# Patient Record
Sex: FEMALE | Race: BLACK OR AFRICAN AMERICAN | Employment: OTHER | URBAN - METROPOLITAN AREA
[De-identification: names, ages, dates, MRNs, and addresses within clinical notes are randomized per-mention and may not be internally consistent; named-entity substitution may affect disease eponyms.]

---

## 2017-06-22 ENCOUNTER — APPOINTMENT (OUTPATIENT)
Dept: GENERAL RADIOLOGY | Facility: CLINIC | Age: 42
End: 2017-06-22
Attending: EMERGENCY MEDICINE
Payer: COMMERCIAL

## 2017-06-22 ENCOUNTER — HOSPITAL ENCOUNTER (EMERGENCY)
Facility: CLINIC | Age: 42
Discharge: HOME | End: 2017-06-22
Attending: EMERGENCY MEDICINE | Admitting: EMERGENCY MEDICINE
Payer: COMMERCIAL

## 2017-06-22 VITALS
OXYGEN SATURATION: 97 % | TEMPERATURE: 95.2 F | DIASTOLIC BLOOD PRESSURE: 72 MMHG | RESPIRATION RATE: 16 BRPM | HEART RATE: 85 BPM | SYSTOLIC BLOOD PRESSURE: 118 MMHG
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
## 2017-06-22 DIAGNOSIS — S80.02XA CONTUSION OF LEFT KNEE, INITIAL ENCOUNTER: ICD-10-CM

## 2017-06-22 LAB
HCG UR QL: NEGATIVE
INTERNAL QC OK POCT: YES

## 2017-06-22 PROCEDURE — 99284 EMERGENCY DEPT VISIT MOD MDM: CPT | Mod: Z6 | Performed by: EMERGENCY MEDICINE

## 2017-06-22 PROCEDURE — 99284 EMERGENCY DEPT VISIT MOD MDM: CPT | Performed by: EMERGENCY MEDICINE

## 2017-06-22 PROCEDURE — 81025 URINE PREGNANCY TEST: CPT | Performed by: EMERGENCY MEDICINE

## 2017-06-22 PROCEDURE — 73562 X-RAY EXAM OF KNEE 3: CPT | Mod: LT

## 2017-06-22 RX ORDER — IBUPROFEN 600 MG/1
600 TABLET, FILM COATED ORAL EVERY 6 HOURS PRN
Qty: 30 TABLET | Refills: 0 | Status: SHIPPED | OUTPATIENT
Start: 2017-06-22

## 2017-06-22 RX ORDER — IBUPROFEN 600 MG/1
600 TABLET, FILM COATED ORAL ONCE
Status: DISCONTINUED | OUTPATIENT
Start: 2017-06-22 | End: 2017-06-23 | Stop reason: HOSPADM

## 2017-06-22 ASSESSMENT — ENCOUNTER SYMPTOMS
NECK PAIN: 0
NUMBNESS: 0
ABDOMINAL PAIN: 0
WEAKNESS: 0
SHORTNESS OF BREATH: 0

## 2017-06-22 NOTE — ED AVS SNAPSHOT
[facility], [place], Emergency Department    [address]    [place] [address]    Phone:  [phone]    Fax:  [phone]                                       Ashlyn Morton   MRN: [id number]    Department:  [facility], Emergency Department   Date of Visit:  6/22/2017           After Visit Summary Signature Page     I have received my discharge instructions, and my questions have been answered. I have discussed any challenges I see with this plan with the nurse or doctor.    ..........................................................................................................................................  Patient/Patient Representative Signature      ..........................................................................................................................................  Patient Representative Print Name and Relationship to Patient    ..................................................               ................................................  Date                                            Time    ..........................................................................................................................................  Reviewed by Signature/Title    ...................................................              ..............................................  Date                                                            Time

## 2017-06-22 NOTE — ED AVS SNAPSHOT
[facility], Emergency Department    [address]    [place] [address]    Phone:  [phone]    Fax:  [phone]                                       Ashlyn Morton   MRN: [id number]    Department:  [facility], Emergency Department   Date of Visit:  6/22/2017           Patient Information     Date Of Birth          1975        Your diagnoses for this visit were:     Contusion of left knee, initial encounter        You were seen by Vish Bruce MD.        Discharge Instructions         Lower Extremity Contusion  You have a contusion (bruise) of a lower extremity (leg, knee, ankle, foot, or toe). Symptoms include pain, swelling, and skin discoloration. No bones are broken. This injury may take from a few days to a few weeks to heal.  During that time, the bruise may change from reddish in color, to purple-blue, to green-yellow, to yellow-brown.  Home care    Unless another medicine was prescribed, you can take acetaminophen, ibuprofen, or naproxen to control pain. (If you have chronic liver or kidney disease or ever had a stomach ulcer or gastrointestinal bleeding, talk with your doctor before using these medicines.)    Elevate the injured area to reduce pain and swelling. As much as possible, sit or lie down with the injured area raised about the level of your heart. This is especially important during the first 48 hours.    Ice the injured area to help reduce pain and swelling. Wrap a cold source (ice pack or ice cubes in a plastic bag) in a thin towel. Apply to the bruised area for 20 minutes every 1 to 2 hours the first day. Continue this 3 to 4 times a day until the pain and swelling goes away.    If crutches have been advised, do not bear full weight on the injured leg until you can do so without pain. You may return to sports when you are able to put full weight and impact on the injured leg without pain.  Follow up  Follow up with your healthcare provider or our staff as advised. Call if  you are not improving within the next 1 to 2 weeks.  When to seek medical advice   Call your healthcare provider right away if any of these occur:    Increased pain or swelling    Foot or toes become cold, blue, numb or tingly    Signs of infection: Warmth, drainage, or increased redness or pain around the injury    Inability to move the injured area     Frequent bruising for unknown reasons  Date Last Reviewed: 2/1/2017 2000-2017 [employer]. [address] [place] [address], [place] [address]. All rights reserved. This information is not intended as a substitute for professional medical care. Always follow your healthcare professional's instructions.      Take ibuprofen as needed for pain.  Ice as discussed.    Follow-up with your employer's occupational health provider or your primary care provider in one week as needed.    Discharge References/Attachments     LOWER EXTREMITY CONTUSION (ENGLISH)      24 Hour Appointment Hotline       To make an appointment at any [facility], call [phone] ([phone]). If you don't have a family doctor or clinic, we will help you find one. [facility] clinics are conveniently located to serve the needs of you and your family.             Review of your medicines      START taking        Dose / Directions Last dose taken    ibuprofen 600 MG tablet   Commonly known as:  ADVIL/MOTRIN   Dose:  600 mg   Quantity:  30 tablet        Take 1 tablet (600 mg) by mouth every 6 hours as needed for moderate pain   Refills:  0                Prescriptions were sent or printed at these locations (1 Prescription)                   Other Prescriptions                Printed at Department/Unit printer (1 of 1)         ibuprofen (ADVIL/MOTRIN) 600 MG tablet                Procedures and tests performed during your visit     Knee XR, 3 views, left    hCG qual urine POCT      Orders Needing Specimen Collection     None      Pending Results     No orders found from 6/20/2017 to  "2017.            Pending Culture Results     No orders found from 2017 to 2017.            Pending Results Instructions     If you had any lab results that were not finalized at the time of your Discharge, you can call the ED Lab Result RN at [phone]. You will be contacted by this team for any positive Lab results or changes in treatment. The nurses are available 7 days a week from 10A to 6:30P.  You can leave a message 24 hours per day and they will return your call.        Thank you for choosing [facility]       Thank you for choosing [facility] for your care. Our goal is always to provide you with excellent care. Hearing back from our patients is one way we can continue to improve our services. Please take a few minutes to complete the written survey that you may receive in the mail after you visit with us. Thank you!        [email or web address] Information     [facility] lets you send messages to your doctor, view your test results, renew your prescriptions, schedule appointments and more. To sign up, go to [email or web address] . Click on \"Log in\" on the left side of the screen, which will take you to the Welcome page. Then click on \"Sign up Now\" on the right side of the page.     You will be asked to enter the access code listed below, as well as some personal information. Please follow the directions to create your username and password.     Your access code is: [email or web address]  Expires: 2017 10:34 PM     Your access code will  in 90 days. If you need help or a new code, please call your [facility] clinic or [phone].        Care EveryWhere ID     This is your Care EveryWhere ID. This could be used by other organizations to access your [facility] medical records  [id number]        Equal Access to Services     MEAGHAN ARMENTA : Mya Miller, joshua doherty, joe lott. So [facility] [phone].    ATENCIÓN: Si janettela español tiene " a hagan disposición servicios gratuitos de asistencia lingüística. Llfransisco al [phone].    We comply with applicable federal civil rights laws and [place] laws. We do not discriminate on the basis of race, color, national origin, age, disability sex, sexual orientation or gender identity.            After Visit Summary       This is your record. Keep this with you and show to your community pharmacist(s) and doctor(s) at your next visit.

## 2017-06-23 NOTE — ED PROVIDER NOTES
History     Chief Complaint   Patient presents with     Knee Pain     slipped on water on floor at work and fell onto left knee; denies swelling; much pain, and slow to ambulate; denies hitting head     HPI  Ashlyn Morton is a 42 year old female who presents to the emergency department with left knee pain.  Patient states that she slipped on water on the floor while at work today and fell onto her left knee.  She states that she had some mild pain throughout the day but the pain worsened when she returned home this evening.  Her fall occurred approximately 12 hours prior to presentation to the emergency department.  She denies any hip pain.  No ankle pain.  No foot pain.  Patient denies any weakness or numbness.  Patient denies any head injury or loss of consciousness.  She has any chest pain or abdominal pain.  No dyspnea.  She denies any other extremity injury including her bilateral wrists and right lower extremity.  Patient denies anticoagulant use.    I have reviewed the Medications, Allergies, Past Medical and Surgical History, and Social History in the Epic system.    Review of Systems   Respiratory: Negative for shortness of breath.    Cardiovascular: Negative for chest pain.   Gastrointestinal: Negative for abdominal pain.   Musculoskeletal: Negative for neck pain.        Left knee pain   Neurological: Negative for weakness and numbness.   All other systems reviewed and are negative.      Physical Exam   BP: 118/72  Pulse: 85  Temp: 95.2  F (35.1  C)  Resp: 16  SpO2: 97 %  Physical Exam   Constitutional: She appears well-developed and well-nourished. No distress.   HENT:   Head: Normocephalic and atraumatic.   Mouth/Throat: No oropharyngeal exudate.   Eyes: Pupils are equal, round, and reactive to light.   Cardiovascular:   Pulses:       Dorsalis pedis pulses are 2+ on the left side.        Posterior tibial pulses are 2+ on the left side.   Pulmonary/Chest: No respiratory distress.   Musculoskeletal: She  exhibits no edema.        Left hip: Normal.        Left knee: She exhibits decreased range of motion. Tenderness found. Medial joint line tenderness noted.        Left ankle: Normal.        Left foot: Normal.   Neurological: She has normal strength. No sensory deficit.   Skin: Skin is warm and dry. No rash noted. She is not diaphoretic.   Nursing note and vitals reviewed.      ED Course     ED Course     Procedures        Results for orders placed or performed during the hospital encounter of 06/22/17 (from the past 24 hour(s))   hCG qual urine POCT   Result Value Ref Range    HCG Qual Urine Negative neg    Internal QC OK Yes    Knee XR, 3 views, left    Narrative    XR KNEE LT 3 VW   6/22/2017 10:05 PM     HISTORY: Pain after trauma.    COMPARISON: None.    FINDINGS: Negative left knee.      Impression    IMPRESSION: Negative.    LEYDI REBOLLEDO MD        Critical Care time:      Medications   ibuprofen (ADVIL/MOTRIN) tablet 600 mg (600 mg Oral Not Given 6/22/17 2149)             Labs Ordered and Resulted from Time of ED Arrival Up to the Time of Departure from the ED   HCG QUAL URINE POCT - Normal            Assessments & Plan (with Medical Decision Making)   42 year old female emergency department with left knee pain after slip and fall at work.  She does not have evidence for fracture on radiograph.  She declined immobilizer and crutches.  Ibuprofen prescribed for pain.  Occupational health/primary care follow-up in one week if still symptomatic.  Ice also recommended.  Detailed written instructions for lower extremity contusion provided.    I have reviewed the nursing notes.    I have reviewed the findings, diagnosis, plan and need for follow up with the patient.    Discharge Medication List as of 6/22/2017 10:35 PM      START taking these medications    Details   ibuprofen (ADVIL/MOTRIN) 600 MG tablet Take 1 tablet (600 mg) by mouth every 6 hours as needed for moderate pain, Disp-30 tablet, R-0, Local Print              Final diagnoses:   Contusion of left knee, initial encounter       6/22/2017   [facility], [place], EMERGENCY DEPARTMENT     Vish Bruce MD  06/23/17 0029

## 2017-06-23 NOTE — DISCHARGE INSTRUCTIONS
Lower Extremity Contusion  You have a contusion (bruise) of a lower extremity (leg, knee, ankle, foot, or toe). Symptoms include pain, swelling, and skin discoloration. No bones are broken. This injury may take from a few days to a few weeks to heal.  During that time, the bruise may change from reddish in color, to purple-blue, to green-yellow, to yellow-brown.  Home care    Unless another medicine was prescribed, you can take acetaminophen, ibuprofen, or naproxen to control pain. (If you have chronic liver or kidney disease or ever had a stomach ulcer or gastrointestinal bleeding, talk with your doctor before using these medicines.)    Elevate the injured area to reduce pain and swelling. As much as possible, sit or lie down with the injured area raised about the level of your heart. This is especially important during the first 48 hours.    Ice the injured area to help reduce pain and swelling. Wrap a cold source (ice pack or ice cubes in a plastic bag) in a thin towel. Apply to the bruised area for 20 minutes every 1 to 2 hours the first day. Continue this 3 to 4 times a day until the pain and swelling goes away.    If crutches have been advised, do not bear full weight on the injured leg until you can do so without pain. You may return to sports when you are able to put full weight and impact on the injured leg without pain.  Follow up  Follow up with your healthcare provider or our staff as advised. Call if you are not improving within the next 1 to 2 weeks.  When to seek medical advice   Call your healthcare provider right away if any of these occur:    Increased pain or swelling    Foot or toes become cold, blue, numb or tingly    Signs of infection: Warmth, drainage, or increased redness or pain around the injury    Inability to move the injured area     Frequent bruising for unknown reasons  Date Last Reviewed: 2/1/2017 2000-2017 [employer]. [address] [place] [address], [place] [address].  All rights reserved. This information is not intended as a substitute for professional medical care. Always follow your healthcare professional's instructions.      Take ibuprofen as needed for pain.  Ice as discussed.    Follow-up with your employer's occupational health provider or your primary care provider in one week as needed.

## 2020-06-05 ENCOUNTER — HOSPITAL ENCOUNTER (OUTPATIENT)
Dept: ULTRASOUND IMAGING | Facility: CLINIC | Age: 45
Discharge: HOME | End: 2020-06-05
Attending: ADVANCED PRACTICE MIDWIFE | Admitting: ADVANCED PRACTICE MIDWIFE
Payer: COMMERCIAL

## 2020-06-05 DIAGNOSIS — O09.91 HIGH-RISK PREGNANCY IN FIRST TRIMESTER: ICD-10-CM

## 2020-06-05 PROCEDURE — 76801 OB US < 14 WKS SINGLE FETUS: CPT

## 2022-03-21 ENCOUNTER — APPOINTMENT (OUTPATIENT)
Dept: GENERAL RADIOLOGY | Facility: CLINIC | Age: 47
End: 2022-03-21
Attending: EMERGENCY MEDICINE
Payer: COMMERCIAL

## 2022-03-21 LAB
ALBUMIN UR-MCNC: NEGATIVE MG/DL
APPEARANCE UR: CLEAR
BILIRUB UR QL STRIP: NEGATIVE
COLOR UR AUTO: ABNORMAL
GLUCOSE UR STRIP-MCNC: NEGATIVE MG/DL
HCG UR QL: NEGATIVE
HGB UR QL STRIP: NEGATIVE
INTERNAL QC OK POCT: NORMAL
KETONES UR STRIP-MCNC: NEGATIVE MG/DL
LEUKOCYTE ESTERASE UR QL STRIP: NEGATIVE
NITRATE UR QL: NEGATIVE
PH UR STRIP: 7.5 [PH] (ref 5–7)
POCT KIT EXPIRATION DATE: NORMAL
POCT KIT LOT NUMBER: NORMAL
RBC URINE: 3 /HPF
SP GR UR STRIP: 1.02 (ref 1–1.03)
SQUAMOUS EPITHELIAL: 5 /HPF
UROBILINOGEN UR STRIP-MCNC: NORMAL MG/DL
WBC URINE: <1 /HPF

## 2022-03-21 PROCEDURE — 93005 ELECTROCARDIOGRAM TRACING: CPT | Mod: 59 | Performed by: EMERGENCY MEDICINE

## 2022-03-21 PROCEDURE — 81025 URINE PREGNANCY TEST: CPT | Performed by: EMERGENCY MEDICINE

## 2022-03-21 PROCEDURE — 71046 X-RAY EXAM CHEST 2 VIEWS: CPT

## 2022-03-21 PROCEDURE — 93308 TTE F-UP OR LMTD: CPT | Performed by: EMERGENCY MEDICINE

## 2022-03-21 PROCEDURE — 81001 URINALYSIS AUTO W/SCOPE: CPT | Performed by: EMERGENCY MEDICINE

## 2022-03-21 PROCEDURE — 93010 ELECTROCARDIOGRAM REPORT: CPT | Mod: 59 | Performed by: EMERGENCY MEDICINE

## 2022-03-21 PROCEDURE — 99285 EMERGENCY DEPT VISIT HI MDM: CPT | Mod: 25 | Performed by: EMERGENCY MEDICINE

## 2022-03-21 PROCEDURE — 99284 EMERGENCY DEPT VISIT MOD MDM: CPT | Mod: 25 | Performed by: EMERGENCY MEDICINE

## 2022-03-21 PROCEDURE — 93308 TTE F-UP OR LMTD: CPT | Mod: 26 | Performed by: EMERGENCY MEDICINE

## 2022-03-22 ENCOUNTER — ANCILLARY PROCEDURE (OUTPATIENT)
Dept: ULTRASOUND IMAGING | Facility: CLINIC | Age: 47
End: 2022-03-22
Attending: EMERGENCY MEDICINE
Payer: COMMERCIAL

## 2022-03-22 ENCOUNTER — HOSPITAL ENCOUNTER (EMERGENCY)
Facility: CLINIC | Age: 47
Discharge: HOME | End: 2022-03-22
Attending: EMERGENCY MEDICINE | Admitting: EMERGENCY MEDICINE
Payer: COMMERCIAL

## 2022-03-22 VITALS
RESPIRATION RATE: 16 BRPM | DIASTOLIC BLOOD PRESSURE: 57 MMHG | WEIGHT: 221 LBS | OXYGEN SATURATION: 98 % | TEMPERATURE: 97.9 F | HEART RATE: 78 BPM | SYSTOLIC BLOOD PRESSURE: 101 MMHG

## 2022-03-22 DIAGNOSIS — R07.89 CHEST DISCOMFORT: ICD-10-CM

## 2022-03-22 LAB
ANION GAP SERPL CALCULATED.3IONS-SCNC: 8 MMOL/L (ref 3–14)
BASOPHILS # BLD AUTO: 0 10E3/UL (ref 0–0.2)
BASOPHILS NFR BLD AUTO: 0 %
BUN SERPL-MCNC: 14 MG/DL (ref 7–30)
CALCIUM SERPL-MCNC: 9.1 MG/DL (ref 8.5–10.1)
CHLORIDE BLD-SCNC: 106 MMOL/L (ref 94–109)
CO2 SERPL-SCNC: 24 MMOL/L (ref 20–32)
CREAT SERPL-MCNC: 0.52 MG/DL (ref 0.52–1.04)
D DIMER PPP FEU-MCNC: <0.27 UG/ML FEU (ref 0–0.5)
EOSINOPHIL # BLD AUTO: 0.3 10E3/UL (ref 0–0.7)
EOSINOPHIL NFR BLD AUTO: 4 %
ERYTHROCYTE [DISTWIDTH] IN BLOOD BY AUTOMATED COUNT: 12.9 % (ref 10–15)
GFR SERPL CREATININE-BSD FRML MDRD: >90 ML/MIN/1.73M2
GLUCOSE BLD-MCNC: 107 MG/DL (ref 70–99)
HCT VFR BLD AUTO: 40.1 % (ref 35–47)
HGB BLD-MCNC: 13.2 G/DL (ref 11.7–15.7)
HOLD SPECIMEN: NORMAL
IMM GRANULOCYTES # BLD: 0 10E3/UL
IMM GRANULOCYTES NFR BLD: 0 %
LYMPHOCYTES # BLD AUTO: 2.9 10E3/UL (ref 0.8–5.3)
LYMPHOCYTES NFR BLD AUTO: 40 %
MCH RBC QN AUTO: 29.5 PG (ref 26.5–33)
MCHC RBC AUTO-ENTMCNC: 32.9 G/DL (ref 31.5–36.5)
MCV RBC AUTO: 90 FL (ref 78–100)
MONOCYTES # BLD AUTO: 0.7 10E3/UL (ref 0–1.3)
MONOCYTES NFR BLD AUTO: 9 %
NEUTROPHILS # BLD AUTO: 3.3 10E3/UL (ref 1.6–8.3)
NEUTROPHILS NFR BLD AUTO: 47 %
NRBC # BLD AUTO: 0 10E3/UL
NRBC BLD AUTO-RTO: 0 /100
PLAT MORPH BLD: NORMAL
PLATELET # BLD AUTO: 324 10E3/UL (ref 150–450)
POTASSIUM BLD-SCNC: 4.3 MMOL/L (ref 3.4–5.3)
RBC # BLD AUTO: 4.47 10E6/UL (ref 3.8–5.2)
RBC MORPH BLD: NORMAL
SODIUM SERPL-SCNC: 138 MMOL/L (ref 133–144)
TROPONIN I SERPL HS-MCNC: <3 NG/L
WBC # BLD AUTO: 7.2 10E3/UL (ref 4–11)

## 2022-03-22 PROCEDURE — 80048 BASIC METABOLIC PNL TOTAL CA: CPT | Performed by: EMERGENCY MEDICINE

## 2022-03-22 PROCEDURE — 250N000009 HC RX 250: Performed by: EMERGENCY MEDICINE

## 2022-03-22 PROCEDURE — 85025 COMPLETE CBC W/AUTO DIFF WBC: CPT | Performed by: EMERGENCY MEDICINE

## 2022-03-22 PROCEDURE — 250N000013 HC RX MED GY IP 250 OP 250 PS 637: Performed by: EMERGENCY MEDICINE

## 2022-03-22 PROCEDURE — 84484 ASSAY OF TROPONIN QUANT: CPT | Performed by: EMERGENCY MEDICINE

## 2022-03-22 PROCEDURE — 85379 FIBRIN DEGRADATION QUANT: CPT | Performed by: EMERGENCY MEDICINE

## 2022-03-22 PROCEDURE — 36415 COLL VENOUS BLD VENIPUNCTURE: CPT | Performed by: EMERGENCY MEDICINE

## 2022-03-22 RX ORDER — FAMOTIDINE 20 MG/1
20 TABLET, FILM COATED ORAL 2 TIMES DAILY
Qty: 40 TABLET | Refills: 0 | Status: SHIPPED | OUTPATIENT
Start: 2022-03-22

## 2022-03-22 RX ORDER — CALCIUM CARBONATE 500 MG/1
1 TABLET, CHEWABLE ORAL 3 TIMES DAILY PRN
Qty: 30 TABLET | Refills: 0 | Status: SHIPPED | OUTPATIENT
Start: 2022-03-22

## 2022-03-22 RX ADMIN — LIDOCAINE HYDROCHLORIDE 30 ML: 20 SOLUTION ORAL; TOPICAL at 02:21

## 2022-03-22 NOTE — ED NOTES
She is here this morning with feeling light headed, SOB and chest pressure. This started yesterday at work. Pain does not change when she takes a deep breath. She does not appear to be in any distress. She was able to walk to the room w/o being SOB.

## 2022-03-22 NOTE — ED PROVIDER NOTES
"  History     Chief Complaint   Patient presents with     Shortness of Breath     \" short of breath and feeling my heart is heavy since last night\" Has also been dizzy for a week, LMP: Feb 3, missed March     HPI  Ashlyn Morton is a 46 year old female with PMH notable for COVID in Jan 2021 (2 doses Pfizer vaccine) who presents to the ED with shortness of breath and chest discomfort.  Patient ports symptom onset about 24 hours ago.  She points to the anterior superior middle of her chest as location of discomfort.  It is more uncomfortable when she is lying down on her back.  She feels mildly short of breath, like she cannot take a deep breath.  Pain is worse with a deep breath.  No history of DVT/PE, no recent leg swelling, no recent immobilization, no history of malignancy.  No recent fever nor cough.  Pain comes and goes..     Past Medical History  No past medical history on file.  No past surgical history on file.  calcium carbonate (TUMS) 500 MG chewable tablet  famotidine (PEPCID) 20 MG tablet  ibuprofen (ADVIL/MOTRIN) 600 MG tablet      No Known Allergies  Social History   Social History     Tobacco Use     Smoking status: Not on file     Smokeless tobacco: Not on file   Substance Use Topics     Alcohol use: Not on file     Drug use: Not on file      Past medical history and social history were reviewed with the patient. Additional pertinent items: None     Review of Systems  A complete review of systems was performed with pertinent positives and negatives noted in the HPI, and all other systems negative.    Physical Exam   BP: 116/76  Pulse: 78  Temp: 97.9  F (36.6  C)  Resp: 16  Weight: 100.2 kg (221 lb)  SpO2: 99 %    Physical Exam  General: No acute distress. Appears stated age.   HENT: MMM, no oropharyngeal lesions  Eyes: PERRL, normal sclerae  Cardio: Regular rate. Regular rhythm. Extremities well perfused  Resp: Normal work of breathing, normal respiratory rate.  Chest/Back: no visual signs of trauma, no " chest wall tenderness  Abdomen: no tenderness, non-distended, no rebound, no guarding  Neuro: alert and fully oriented. CN II-XII grossly intact. Grossly normal strength and sensation in all extremities.   MSK: no deformities. Grossly normal ROM in extremities.   Integumentary/Skin: no rash visualized, normal color  Psych: normal affect, normal behavior    ED Course      Procedures  Results for orders placed during the hospital encounter of 03/22/22    POC US ECHO LIMITED    Impression  Limited Bedside ED Cardiac Ultrasound  PROCEDURE: PERFORMED BY: Dr. Meek Raines MD  INDICATIONS/SYMPTOM:  Chest Pain  PROBE: Cardiac phased array probe  BODY LOCATION: Chest (cardiac)  FINDINGS: The ultrasound was performed utilizing the subcostal, parasternal long axis, parasternal short axis and apical 4 chamber views.  Grossly normal LV contractility. No RV dilation visualized. No pericardial effusion visualized. IVC grossly small in diameter with > 50% respiratory variability.  INTERPRETATION:    Grossly normal LV contractility. No pericardial effusion. No RV dilation. IVC size and variability consistent with [id number].  IMAGE DOCUMENTATION: Images were archived to PACs system.            EKG Interpretation:      Interpreted by Meek Raines MD  Time reviewed: 0150  Symptoms at time of EKG: chest discomfort, shortness of breath   Rhythm: normal sinus   Rate: Normal  Axis: Normal  Ectopy: none  Conduction: normal  ST Segments/ T Waves: No ST-T wave changes and No acute ischemic changes  Q Waves: very small Q in III  Comparison to prior: No old EKG available    Clinical Impression: normal EKG                   Labs Ordered and Resulted from Time of ED Arrival to Time of ED Departure   ROUTINE UA WITH MICROSCOPIC REFLEX TO CULTURE - Abnormal       Result Value    Color Urine Light Yellow      Appearance Urine Clear      Glucose Urine Negative      Bilirubin Urine Negative      Ketones Urine Negative       Specific Gravity Urine 1.024      Blood Urine Negative      pH Urine 7.5 (*)     Protein Albumin Urine Negative      Urobilinogen Urine Normal      Nitrite Urine Negative      Leukocyte Esterase Urine Negative      RBC Urine 3 (*)     WBC Urine <1      Squamous Epithelials Urine 5 (*)    BASIC METABOLIC PANEL - Abnormal    Sodium 138      Potassium 4.3      Chloride 106      Carbon Dioxide (CO2) 24      Anion Gap 8      Urea Nitrogen 14      Creatinine 0.52      Calcium 9.1      Glucose 107 (*)     GFR Estimate >90     TROPONIN I - Normal    Troponin I High Sensitivity <3     D DIMER QUANTITATIVE - Normal    D-Dimer Quantitative <0.27     HCG QUALITATIVE URINE POCT - Normal    HCG Qual Urine Negative      Internal QC Check POCT Valid      POCT Kit Lot Number [id number]      POCT Kit Expiration Date 2/28/2023     CBC WITH PLATELETS AND DIFFERENTIAL    WBC Count 7.2      RBC Count 4.47      Hemoglobin 13.2      Hematocrit 40.1      MCV 90      MCH 29.5      MCHC 32.9      RDW 12.9      Platelet Count 324      % Neutrophils 47      % Lymphocytes 40      % Monocytes 9      % Eosinophils 4      % Basophils 0      % Immature Granulocytes 0      NRBCs per 100 WBC 0      Absolute Neutrophils 3.3      Absolute Lymphocytes 2.9      Absolute Monocytes 0.7      Absolute Eosinophils 0.3      Absolute Basophils 0.0      Absolute Immature Granulocytes 0.0      Absolute NRBCs 0.0     RBC AND PLATELET MORPHOLOGY    Platelet Assessment        Value: Automated Count Confirmed. Platelet morphology is normal.    RBC Morphology Confirmed RBC Indices       POC US ECHO LIMITED   Final Result   Limited Bedside ED Cardiac Ultrasound   PROCEDURE: PERFORMED BY: Dr. Meek Raines MD   INDICATIONS/SYMPTOM:  Chest Pain   PROBE: Cardiac phased array probe   BODY LOCATION: Chest (cardiac)   FINDINGS: The ultrasound was performed utilizing the subcostal, parasternal long axis, parasternal short axis and apical 4 chamber views.   Grossly normal LV  contractility. No RV dilation visualized. No pericardial effusion visualized. IVC grossly small in diameter with > 50% respiratory variability.    INTERPRETATION:    Grossly normal LV contractility. No pericardial effusion. No RV dilation. IVC size and variability consistent with [id number].    IMAGE DOCUMENTATION: Images were archived to PACs system.         Chest XR,  PA & LAT   Final Result   IMPRESSION: Heart size is normal. Lungs are clear. No pneumothorax or pleural effusion.             Assessments & Plan (with Medical Decision Making)   Patient presenting with chest discomfort and mild SOB. Vitals in the ED unremarkable. Nursing notes reviewed.     ECG shows NSR without evidence of acute ischemia, high-sensitivity troponin undetectably low - ACS very unlikely. VTE risk factor profile low, appropriate for D-dimer risk determination - D-dimer wnl, PE very unlikely. Character and severity of pain less severe than would be expected for aortic dissection. Bedside cardiac US demonstrates grossly normal LV contractility, no RV dilation, no pericardial effusion. Lack of ECG findings and lack of effusion makes pericarditis very unlikely. CXR without evidence of pneumonia, pneumothorax, pleural effusion, nor other visualized pathology.      In the ED, the patient's symptoms were managed with GI cocktail, with resolution of symptoms upon reassessment. Otherwise negative broad work-up with resolution after GI cocktail is suggestive of gastric reflux as likely etiology.     After counseling on the diagnosis, work-up, and treatment plan, the patient was discharged to home. The patient was advised to follow-up with primary care in about a week. The patient was advised to return to the ED if worsening symptoms, or if there are any urgent/life-threatening concerns.     Final diagnoses:   Chest discomfort     New Prescriptions    CALCIUM CARBONATE (TUMS) 500 MG CHEWABLE TABLET    Take 1 tablet (500 mg) by mouth 3  times daily as needed for heartburn    FAMOTIDINE (PEPCID) 20 MG TABLET    Take 1 tablet (20 mg) by mouth 2 times daily       --  Meek Raines MD   Emergency Medicine   [facility] EMERGENCY DEPARTMENT  3/21/2022     Meek Raines MD  03/22/22 3370

## 2022-03-22 NOTE — ED TRIAGE NOTES
"Has been dizzy for a week, could not sleep last night \" because I feel like there is no oxygen\"  Hx COVID Jan 2021  Vaccinated x 2 Pfizer  "

## 2022-03-22 NOTE — DISCHARGE INSTRUCTIONS
Instructions from your doctor today:  Emergency Department testing is focused on the potential causes of your symptoms that are the most dangerous possibilities, and cannot cover every possibility. Based on the evaluation, it was deemed sufficiently safe to discharge and continue management through the clinics. Thus, follow-up is very important to assess for improvement/worsening, potential further testing, and potential treatment adjustments. If you were given opioid pain medications or other medications that can make you drowsy while in the ED, you should not drive for at least several hours and not until you feel completely back to normal.     Please make an appointment to follow up with:  - Your Primary Care Provider in 5-8 days  - If you do not have a primary care provider, you can be seen in follow-up and establish care by calling any of the clinics below:     - [facility] (phone: [phone])     - Primary Care / [facility] (phone: [phone])   - Have your clinic provider review the results from today's visit with you again, including any potential follow-up or additional testing that may be needed based on the results. Occasionally, incidental findings are found on later review by radiologists that may need follow-up.     Return to the Emergency Department immediately if you have worsening symptoms, or any other urgent or potentially life-threatening concerns.

## 2022-03-23 LAB
ATRIAL RATE - MUSE: 73 BPM
DIASTOLIC BLOOD PRESSURE - MUSE: NORMAL MMHG
INTERPRETATION ECG - MUSE: NORMAL
P AXIS - MUSE: 54 DEGREES
PR INTERVAL - MUSE: 176 MS
QRS DURATION - MUSE: 76 MS
QT - MUSE: 350 MS
QTC - MUSE: 385 MS
R AXIS - MUSE: 79 DEGREES
SYSTOLIC BLOOD PRESSURE - MUSE: NORMAL MMHG
T AXIS - MUSE: 45 DEGREES
VENTRICULAR RATE- MUSE: 73 BPM